# Patient Record
Sex: MALE | Race: WHITE
[De-identification: names, ages, dates, MRNs, and addresses within clinical notes are randomized per-mention and may not be internally consistent; named-entity substitution may affect disease eponyms.]

---

## 2018-08-05 ENCOUNTER — HOSPITAL ENCOUNTER (EMERGENCY)
Dept: HOSPITAL 95 - ER | Age: 63
Discharge: HOME | End: 2018-08-05
Payer: COMMERCIAL

## 2018-08-05 VITALS — BODY MASS INDEX: 28.35 KG/M2 | WEIGHT: 198 LBS | HEIGHT: 70 IN

## 2018-08-05 DIAGNOSIS — T18.128A: Primary | ICD-10-CM

## 2018-08-05 DIAGNOSIS — Z79.899: ICD-10-CM

## 2018-12-19 ENCOUNTER — HOSPITAL ENCOUNTER (OUTPATIENT)
Dept: HOSPITAL 95 - MHTC | Age: 63
Discharge: HOME | End: 2018-12-19
Attending: INTERNAL MEDICINE
Payer: COMMERCIAL

## 2018-12-19 VITALS — HEIGHT: 70.98 IN | BODY MASS INDEX: 26.23 KG/M2 | WEIGHT: 187.39 LBS

## 2018-12-19 DIAGNOSIS — R55: Primary | ICD-10-CM

## 2018-12-19 DIAGNOSIS — R07.9: ICD-10-CM

## 2018-12-19 DIAGNOSIS — R94.39: ICD-10-CM

## 2018-12-19 PROCEDURE — C1894 INTRO/SHEATH, NON-LASER: HCPCS

## 2018-12-19 PROCEDURE — C1769 GUIDE WIRE: HCPCS

## 2018-12-27 ENCOUNTER — HOSPITAL ENCOUNTER (INPATIENT)
Dept: HOSPITAL 95 - PCU | Age: 63
LOS: 2 days | Discharge: HOME | DRG: 74 | End: 2018-12-29
Attending: INTERNAL MEDICINE | Admitting: INTERNAL MEDICINE
Payer: COMMERCIAL

## 2018-12-27 ENCOUNTER — HOSPITAL ENCOUNTER (EMERGENCY)
Dept: HOSPITAL 95 - ER | Age: 63
Discharge: HOME | End: 2018-12-27
Payer: COMMERCIAL

## 2018-12-27 VITALS — HEIGHT: 70 IN | BODY MASS INDEX: 27.14 KG/M2 | WEIGHT: 189.6 LBS

## 2018-12-27 VITALS — BODY MASS INDEX: 25.05 KG/M2 | WEIGHT: 175 LBS | HEIGHT: 70 IN

## 2018-12-27 DIAGNOSIS — G90.9: Primary | ICD-10-CM

## 2018-12-27 DIAGNOSIS — Z95.0: ICD-10-CM

## 2018-12-27 DIAGNOSIS — I49.5: ICD-10-CM

## 2018-12-27 DIAGNOSIS — I95.1: Primary | ICD-10-CM

## 2018-12-27 DIAGNOSIS — R55: ICD-10-CM

## 2018-12-27 DIAGNOSIS — I45.89: ICD-10-CM

## 2018-12-27 DIAGNOSIS — Z79.899: ICD-10-CM

## 2018-12-27 LAB
ALBUMIN SERPL BCP-MCNC: 3.3 G/DL (ref 3.4–5)
ALBUMIN/GLOB SERPL: 1 {RATIO} (ref 0.8–1.8)
ALT SERPL W P-5'-P-CCNC: 28 U/L (ref 12–78)
ANION GAP SERPL CALCULATED.4IONS-SCNC: 8 MMOL/L (ref 6–16)
AST SERPL W P-5'-P-CCNC: 26 U/L (ref 12–37)
BASOPHILS # BLD AUTO: 0.04 K/MM3 (ref 0–0.23)
BASOPHILS NFR BLD AUTO: 0 % (ref 0–2)
BILIRUB SERPL-MCNC: 0.6 MG/DL (ref 0.1–1)
BUN SERPL-MCNC: 14 MG/DL (ref 8–24)
CALCIUM SERPL-MCNC: 7.9 MG/DL (ref 8.5–10.1)
CHLORIDE SERPL-SCNC: 111 MMOL/L (ref 98–108)
CO2 SERPL-SCNC: 25 MMOL/L (ref 21–32)
CREAT SERPL-MCNC: 1.11 MG/DL (ref 0.6–1.2)
DEPRECATED RDW RBC AUTO: 44.6 FL (ref 35.1–46.3)
EOSINOPHIL # BLD AUTO: 0.27 K/MM3 (ref 0–0.68)
EOSINOPHIL NFR BLD AUTO: 3 % (ref 0–6)
ERYTHROCYTE [DISTWIDTH] IN BLOOD BY AUTOMATED COUNT: 13.4 % (ref 11.7–14.2)
GLOBULIN SER CALC-MCNC: 3.4 G/DL (ref 2.2–4)
GLUCOSE SERPL-MCNC: 118 MG/DL (ref 70–99)
HCT VFR BLD AUTO: 42.6 % (ref 37–53)
HGB BLD-MCNC: 14.5 G/DL (ref 13.5–17.5)
IMM GRANULOCYTES # BLD AUTO: 0.03 K/MM3 (ref 0–0.1)
IMM GRANULOCYTES NFR BLD AUTO: 0 % (ref 0–1)
LYMPHOCYTES # BLD AUTO: 2.22 K/MM3 (ref 0.84–5.2)
LYMPHOCYTES NFR BLD AUTO: 22 % (ref 21–46)
MCHC RBC AUTO-ENTMCNC: 34 G/DL (ref 31.5–36.5)
MCV RBC AUTO: 91 FL (ref 80–100)
MONOCYTES # BLD AUTO: 0.71 K/MM3 (ref 0.16–1.47)
MONOCYTES NFR BLD AUTO: 7 % (ref 4–13)
NEUTROPHILS # BLD AUTO: 6.64 K/MM3 (ref 1.96–9.15)
NEUTROPHILS NFR BLD AUTO: 67 % (ref 41–73)
NRBC # BLD AUTO: 0 K/MM3 (ref 0–0.02)
NRBC BLD AUTO-RTO: 0 /100 WBC (ref 0–0.2)
PLATELET # BLD AUTO: 222 K/MM3 (ref 150–400)
POTASSIUM SERPL-SCNC: 3.8 MMOL/L (ref 3.5–5.5)
PROT SERPL-MCNC: 6.7 G/DL (ref 6.4–8.2)
SODIUM SERPL-SCNC: 144 MMOL/L (ref 136–145)

## 2020-11-03 NOTE — NUR
Ambulatory in Day Surgery
History, Chart, Medications and Allergies reviewed before start of
procedure.
Lungs clear T/O to Auscultation.
Pre-Op teaching done. Pt verbalizes understanding.

## 2020-11-03 NOTE — NUR
Patient up to Ambulate independently. Gait steady.
Discharge instructions reviewed with patient. Patient verbalizes understanding.
Copy given to patient to take home.
Patient States Post-Procedure ride home has been arranged.
Discharged via wheelchair to private car for ride home.ALL BELONINGS RETURNED
TO PATIENT.

## 2020-11-03 NOTE — NUR
Dressing to procedure site clean, dry, intact with no visible
drainage, swelling, erythema or bruising noted. PROVIDED FOOD, FLUID AND PAIN
PILL.